# Patient Record
Sex: MALE | Race: WHITE | NOT HISPANIC OR LATINO | URBAN - METROPOLITAN AREA
[De-identification: names, ages, dates, MRNs, and addresses within clinical notes are randomized per-mention and may not be internally consistent; named-entity substitution may affect disease eponyms.]

---

## 2017-01-31 ENCOUNTER — OFFICE VISIT - HEALTHEAST (OUTPATIENT)
Dept: INTERNAL MEDICINE | Facility: CLINIC | Age: 32
End: 2017-01-31

## 2017-01-31 DIAGNOSIS — Z72.0 TOBACCO USE: ICD-10-CM

## 2017-01-31 DIAGNOSIS — R45.81 LOW SELF ESTEEM: ICD-10-CM

## 2017-01-31 DIAGNOSIS — F41.9 ANXIETY DISORDER, UNSPECIFIED: ICD-10-CM

## 2021-05-30 VITALS — WEIGHT: 175.8 LBS | BODY MASS INDEX: 25.05 KG/M2

## 2021-06-08 NOTE — PROGRESS NOTES
Internal Medicine Office Visit  Patient Name: Henry Calhoun  Patient Age: 31 y.o.  YOB: 1985  MRN: [id number]  ?  Date of Visit: 2017  Reason for Office Visit:   Chief Complaint   Patient presents with     Anxiety     friends noticed anxiety symptoms       Assessment / Plan / Medical Decision Makin. Anxiety disorder, unspecified  2. Low self esteem  3. Tobacco use   - Discussed with patient today that I do not think that he fits the diagnostic criteria for a generalized anxiety disorder or depressive disorder.  My recommendation is that he continue to see the therapist that he has established care with and continue to evaluate some of the friendships that he has and his interpersonal interactions.  If he finds that his symptoms are worsening or fail to improve he is encouraged to follow up as necessary. I do not recommend medication for him at this time and he is in agreement with this plan.   - Tobacco counseling done today.  The patient is not interested in any medication to help him stop at this time.  His motivation to quit is low.  He will consider this and contact the office if he needs additional assistance with tobacco cessation.      Health Maintenance Review  Health Maintenance   Topic Date Due     ADVANCE DIRECTIVES DISCUSSED WITH PATIENT  10/05/2021     TD 18+ HE  10/05/2026     INFLUENZA VACCINE RULE BASED  Addressed     TDAP ADULT ONE TIME DOSE  Completed       Mr. Calhoun does not currently have medications on file.     HPI:   Encounter Diagnoses   Name Primary?     Anxiety disorder, unspecified Yes     Low self esteem      Tobacco use       Henry Calhoun is a 31-year-old male who presents to the office today with concerns of a possible anxiety disorder.  He states that he started seeing a therapist recently, has done 3 sessions so far.  He states that over his lifetime he can recall that he has had a lot of self-doubt and finds that he is very easily offended and overreacts  in situations that he finds stressful.  He describes this past weekend he had a trip planned with his friends to meet up at a certain time to go snowboarding.  His friends did not communicate with him that they would not make the agreed-upon time and he found himself becoming irritated by this.  He does endorse that he worries too much about different things particularly concerning others opinions of him or that her general well-being and values this over his own well-being.  His friends thought that perhaps he should talk to someone about whether or not medication was indicated for his symptoms.  He has made changes to his lifestyle and has been working out more.  He has started going to [employer] events to meet other people and experienced new things. He recently went through a break up with a girlfriend and is doing okay with this.     He was recently evaluated for tobacco use and prescribed Chantix.  He states that he took this for approximately one month but his main motivation for stopping smoking with his girlfriend at the time.  After they broke up he returned to smoking and has not had the same desire or drive to stop smoking.  He has used Wellbutrin in the past.  He is not interested in nicotine patches or gum at this time.    Review of Systems: Denies thoughts of suicide or harming others. He denies paranoia.     Current Scheduled Meds:  No outpatient encounter prescriptions on file as of 1/31/2017.     No facility-administered encounter medications on file as of 1/31/2017.      No past medical history on file.  Past Surgical History   Procedure Laterality Date     No past surgeries       Social History   Substance Use Topics     Smoking status: Current Every Day Smoker     Packs/day: 1.00     Years: 20.00     Smokeless tobacco: None     Alcohol use 14.4 oz/week     24 Cans of beer per week      Comment: CAGE negative. Declines assistance with alcohol use        Objective / Physical Examination:  Vitals:     01/31/17 1456   BP: 116/88   Patient Site: Left Arm   Patient Position: Sitting   Cuff Size: Adult Regular   Pulse: 76   Weight: 175 lb 12.8 oz (79.7 kg)     Wt Readings from Last 3 Encounters:   01/31/17 175 lb 12.8 oz (79.7 kg)   10/10/16 181 lb (82.1 kg)   10/05/16 181 lb 12.8 oz (82.5 kg)     Body mass index is 25.05 kg/(m^2).     General Appearance: Alert and oriented, cooperative, affect appropriate, speech clear, in no apparent distress  Psych: He is well groomed and insight is good.  He does not appear depressed or anxious.      No orders of the defined types were placed in this encounter.  Followup: Return if symptoms worsen or fail to improve. earlier if needed.    Total time spent with patient was 25 minutes with >50% of time spent in face-to-face counseling and coordination of care       Josephine Norris, CNP  [facility]